# Patient Record
Sex: FEMALE | Race: WHITE | NOT HISPANIC OR LATINO | URBAN - NONMETROPOLITAN AREA
[De-identification: names, ages, dates, MRNs, and addresses within clinical notes are randomized per-mention and may not be internally consistent; named-entity substitution may affect disease eponyms.]

---

## 2019-01-23 ENCOUNTER — APPOINTMENT (RX ONLY)
Dept: URBAN - NONMETROPOLITAN AREA CLINIC 1 | Facility: CLINIC | Age: 24
Setting detail: DERMATOLOGY
End: 2019-01-23

## 2019-01-23 DIAGNOSIS — L259 CONTACT DERMATITIS AND OTHER ECZEMA, UNSPECIFIED CAUSE: ICD-10-CM

## 2019-01-23 PROBLEM — L20.84 INTRINSIC (ALLERGIC) ECZEMA: Status: ACTIVE | Noted: 2019-01-23

## 2019-01-23 PROBLEM — L29.8 OTHER PRURITUS: Status: ACTIVE | Noted: 2019-01-23

## 2019-01-23 PROBLEM — L30.9 DERMATITIS, UNSPECIFIED: Status: ACTIVE | Noted: 2019-01-23

## 2019-01-23 PROCEDURE — ? PRESCRIPTION

## 2019-01-23 PROCEDURE — 99202 OFFICE O/P NEW SF 15 MIN: CPT

## 2019-01-23 PROCEDURE — ? COUNSELING

## 2019-01-23 RX ORDER — HYDROXYZINE HYDROCHLORIDE 10 MG/1
TABLET, FILM COATED ORAL
Qty: 60 | Refills: 2 | Status: ERX | COMMUNITY
Start: 2019-01-23

## 2019-01-23 RX ORDER — TRIAMCINOLONE ACETONIDE 1 MG/G
CREAM TOPICAL
Qty: 1 | Refills: 1 | Status: ERX | COMMUNITY
Start: 2019-01-23

## 2019-01-23 RX ADMIN — HYDROXYZINE HYDROCHLORIDE: 10 TABLET, FILM COATED ORAL at 13:42

## 2019-01-23 RX ADMIN — TRIAMCINOLONE ACETONIDE: 1 CREAM TOPICAL at 13:41

## 2019-01-23 ASSESSMENT — LOCATION SIMPLE DESCRIPTION DERM
LOCATION SIMPLE: RIGHT UPPER BACK
LOCATION SIMPLE: ABDOMEN

## 2019-01-23 ASSESSMENT — LOCATION DETAILED DESCRIPTION DERM
LOCATION DETAILED: RIGHT INFERIOR MEDIAL UPPER BACK
LOCATION DETAILED: EPIGASTRIC SKIN

## 2019-01-23 ASSESSMENT — LOCATION ZONE DERM: LOCATION ZONE: TRUNK

## 2019-01-23 NOTE — PROCEDURE: COUNSELING
Patient Specific Counseling (Will Not Stick From Patient To Patient): Recommended Triamcinolone cream for body, otc Zyrtec 1 po QAM and 1 po QHS, also hydroxzyine 10 mg 1-2 po q6-8 hrs prn itching and d/c sprintec for a few weeks.
Detail Level: Simple

## 2019-01-23 NOTE — HPI: RASH
How Severe Is Your Rash?: moderate
Is This A New Presentation, Or A Follow-Up?: Rash
Additional History: Pt comes in for rash on arms, hands and inner thighs for 10-11 days now. Pt has been to urgent care and was given prednisone and premethrin cream.. pt does have hx of eczema. Pt denies any new house hold products  like soaps, laundry soap, lotions, body sprays, or outside activities.. pt did start a new medication to a birth control sprintec and has been on it  for 3 weeks.